# Patient Record
Sex: FEMALE | Race: BLACK OR AFRICAN AMERICAN
[De-identification: names, ages, dates, MRNs, and addresses within clinical notes are randomized per-mention and may not be internally consistent; named-entity substitution may affect disease eponyms.]

---

## 2017-04-07 ENCOUNTER — HOSPITAL ENCOUNTER (OUTPATIENT)
Dept: HOSPITAL 84 - OBSVTIME | Age: 62
LOS: 1 days | Discharge: HOME | End: 2017-04-08
Attending: FAMILY MEDICINE
Payer: MEDICARE

## 2017-04-07 VITALS — WEIGHT: 96 LBS | BODY MASS INDEX: 15.43 KG/M2 | HEIGHT: 66 IN

## 2017-04-07 DIAGNOSIS — E11.65: ICD-10-CM

## 2017-04-07 DIAGNOSIS — Z91.14: ICD-10-CM

## 2017-04-07 DIAGNOSIS — G45.9: Primary | ICD-10-CM

## 2017-04-07 DIAGNOSIS — R47.81: ICD-10-CM

## 2017-04-07 DIAGNOSIS — Z79.84: ICD-10-CM

## 2017-04-07 DIAGNOSIS — Z86.73: ICD-10-CM

## 2017-04-07 LAB
ALBUMIN SERPL-MCNC: 4.4 G/DL (ref 3.4–5)
ALP SERPL-CCNC: 227 U/L (ref 46–116)
ALT SERPL-CCNC: 19 U/L (ref 10–68)
ANION GAP SERPL CALC-SCNC: 31.4 MMOL/L (ref 8–16)
APPEARANCE UR: CLEAR
APTT BLD: 24.9 SECONDS (ref 22.8–39.4)
BACTERIA #/AREA URNS HPF: (no result) /HPF
BASOPHILS NFR BLD AUTO: 0.6 % (ref 0–2)
BILIRUB SERPL-MCNC: 0.46 MG/DL (ref 0.2–1.3)
BILIRUB SERPL-MCNC: NEGATIVE MG/DL
BUN SERPL-MCNC: 30 MG/DL (ref 7–18)
CALCIUM SERPL-MCNC: 10.1 MG/DL (ref 8.5–10.1)
CHLORIDE SERPL-SCNC: 92 MMOL/L (ref 98–107)
CO2 SERPL-SCNC: 14.6 MMOL/L (ref 21–32)
COLOR UR: YELLOW
CREAT SERPL-MCNC: 1.5 MG/DL (ref 0.6–1.3)
EOSINOPHIL NFR BLD: 0 % (ref 0–7)
ERYTHROCYTE [DISTWIDTH] IN BLOOD BY AUTOMATED COUNT: 13.8 % (ref 11.5–14.5)
GLOBULIN SER-MCNC: 5.9 G/L
GLUCOSE SERPL-MCNC: 1000 MG/DL
GLUCOSE SERPL-MCNC: 683 MG/DL (ref 74–106)
HCT VFR BLD CALC: 40.3 % (ref 36–48)
HGB BLD-MCNC: 12.4 G/DL (ref 12–16)
IMM GRANULOCYTES NFR BLD: 0.2 % (ref 0–5)
INR PPP: 0.95 (ref 0.85–1.17)
KETONES SERPL-MCNC: (no result) MG/DL
KETONES UR STRIP-MCNC: (no result) MG/DL
LEUKOCYTE ESTERASE: NEGATIVE
LYMPHOCYTES NFR BLD AUTO: 29.5 % (ref 15–50)
MAGNESIUM SERPL-MCNC: 2.2 MG/DL (ref 1.8–2.4)
MCH RBC QN AUTO: 28.8 PG (ref 26–34)
MCHC RBC AUTO-ENTMCNC: 30.8 G/DL (ref 31–37)
MCV RBC: 93.5 FL (ref 80–100)
MONOCYTES NFR BLD: 5.8 % (ref 2–11)
NEUTROPHILS NFR BLD AUTO: 63.9 % (ref 40–80)
NITRITE UR-MCNC: NEGATIVE MG/ML
OSMOLALITY SERPL CALC.SUM OF ELEC: 303 MOSM/KG (ref 275–300)
PH UR STRIP: 5 [PH] (ref 5–6)
PLATELET # BLD: 203 10X3/UL (ref 130–400)
PMV BLD AUTO: 11.9 FL (ref 7.4–10.4)
POTASSIUM SERPL-SCNC: 5 MMOL/L (ref 3.5–5.1)
PROT SERPL-MCNC: 10.3 G/DL (ref 6.4–8.2)
PROT UR-MCNC: (no result) MG/DL
PROTHROMBIN TIME: 12.5 SECONDS (ref 11.6–15)
RBC # BLD AUTO: 4.31 10X6/UL (ref 4–5.4)
RBC #/AREA URNS HPF: (no result) /HPF (ref 0–5)
SODIUM SERPL-SCNC: 133 MMOL/L (ref 136–145)
SP GR UR STRIP: 1.01 (ref 1–1.02)
SQUAMOUS #/AREA URNS HPF: (no result) /HPF (ref 0–5)
UROBILINOGEN UR-MCNC: NORMAL MG/DL
WBC # BLD AUTO: 8.2 10X3/UL (ref 4.8–10.8)
WBC #/AREA URNS HPF: (no result) /HPF (ref 0–5)

## 2017-04-08 VITALS — DIASTOLIC BLOOD PRESSURE: 60 MMHG | SYSTOLIC BLOOD PRESSURE: 105 MMHG

## 2017-04-08 VITALS — DIASTOLIC BLOOD PRESSURE: 64 MMHG | SYSTOLIC BLOOD PRESSURE: 104 MMHG

## 2017-04-08 VITALS — SYSTOLIC BLOOD PRESSURE: 103 MMHG | DIASTOLIC BLOOD PRESSURE: 61 MMHG

## 2017-04-08 VITALS — SYSTOLIC BLOOD PRESSURE: 141 MMHG | DIASTOLIC BLOOD PRESSURE: 67 MMHG

## 2017-04-08 VITALS — SYSTOLIC BLOOD PRESSURE: 104 MMHG | DIASTOLIC BLOOD PRESSURE: 64 MMHG

## 2017-04-08 VITALS — SYSTOLIC BLOOD PRESSURE: 95 MMHG | DIASTOLIC BLOOD PRESSURE: 57 MMHG

## 2017-04-08 NOTE — NUR
PT REC'D FROM [facility]. PT RESTING IN BED WITH SISTERS AT BEDSIDE. AAOX4.
PIV TO L FOREARM SALINE LOCKED. NO S/SX OF INFECTION. BED LOW, CALL LIGHT IN
REACH, DENIES NEEDS. CPOC.

## 2017-04-08 NOTE — NUR
CURRENT FSBS 107. NO INSULIN ADMINISTERED PER SS. PT RESTING IN BED WITH LUNCH
TRAY IN ROOM. FAMILY AT BEDSIDE. BED LOW, CALL LIGHT IN REACH, DENIES NEEDS.
CPOC.

## 2017-04-08 NOTE — NUR
PT AOX4 RESP EVEN AND NONLABORED PT DENIES NEEDS AT THIS TIME IV TO LEFT
FOREARM PATENT AND INTACT. PT IN FOR DEHYDRATION AND NONCOMPLIANT DM. GIVING
FLUIDS IV AND MONITORING FSBS. BED AT LOWEST SETTING CALL LIGHT WITHIN REACH
WILL CONTINUE TO MONITOR

## 2017-04-08 NOTE — NUR
PIV TO L FOREARM DC'D WITH CATHETER INTACT. DRESSING APPLIED. ESCORTED OUT VIA
WC. DC PAPERS IN HAND.

## 2017-04-08 NOTE — NUR
8 UNITS OF INSULIN ADMINISTERED PER SS. FSBS 248. DINNER TRAY AT BEDSIDE.
FAMILY IN ROOM. ORDERS FOR DC OBTAINED. BED LOW, CALL LIGHT IN REACH, DENIES
NEEDS. CPOC.

## 2017-04-08 NOTE — NUR
PATIENT O2 TURNED DOWN TO 1.5 DUE TO SPO2 READING 100 ON LEFT THUMB. INCREASED
CAP. FILL TIME TO 6SECONDS.

## 2017-10-29 ENCOUNTER — HOSPITAL ENCOUNTER (INPATIENT)
Dept: HOSPITAL 84 - D.ER | Age: 62
LOS: 2 days | Discharge: HOME | DRG: 637 | End: 2017-10-31
Attending: FAMILY MEDICINE | Admitting: FAMILY MEDICINE
Payer: MEDICARE

## 2017-10-29 VITALS — DIASTOLIC BLOOD PRESSURE: 59 MMHG | SYSTOLIC BLOOD PRESSURE: 111 MMHG

## 2017-10-29 VITALS — SYSTOLIC BLOOD PRESSURE: 119 MMHG | DIASTOLIC BLOOD PRESSURE: 63 MMHG

## 2017-10-29 VITALS — DIASTOLIC BLOOD PRESSURE: 64 MMHG | SYSTOLIC BLOOD PRESSURE: 111 MMHG

## 2017-10-29 VITALS — DIASTOLIC BLOOD PRESSURE: 67 MMHG | SYSTOLIC BLOOD PRESSURE: 111 MMHG

## 2017-10-29 VITALS — DIASTOLIC BLOOD PRESSURE: 60 MMHG | SYSTOLIC BLOOD PRESSURE: 115 MMHG

## 2017-10-29 VITALS — DIASTOLIC BLOOD PRESSURE: 67 MMHG | SYSTOLIC BLOOD PRESSURE: 144 MMHG

## 2017-10-29 VITALS — DIASTOLIC BLOOD PRESSURE: 65 MMHG | SYSTOLIC BLOOD PRESSURE: 111 MMHG

## 2017-10-29 VITALS — DIASTOLIC BLOOD PRESSURE: 74 MMHG | SYSTOLIC BLOOD PRESSURE: 125 MMHG

## 2017-10-29 VITALS — SYSTOLIC BLOOD PRESSURE: 110 MMHG | DIASTOLIC BLOOD PRESSURE: 58 MMHG

## 2017-10-29 VITALS — DIASTOLIC BLOOD PRESSURE: 58 MMHG | SYSTOLIC BLOOD PRESSURE: 119 MMHG

## 2017-10-29 VITALS — SYSTOLIC BLOOD PRESSURE: 115 MMHG | DIASTOLIC BLOOD PRESSURE: 60 MMHG

## 2017-10-29 VITALS — SYSTOLIC BLOOD PRESSURE: 115 MMHG | DIASTOLIC BLOOD PRESSURE: 70 MMHG

## 2017-10-29 VITALS — DIASTOLIC BLOOD PRESSURE: 58 MMHG | SYSTOLIC BLOOD PRESSURE: 123 MMHG

## 2017-10-29 VITALS — SYSTOLIC BLOOD PRESSURE: 164 MMHG | DIASTOLIC BLOOD PRESSURE: 79 MMHG

## 2017-10-29 VITALS — SYSTOLIC BLOOD PRESSURE: 119 MMHG | DIASTOLIC BLOOD PRESSURE: 66 MMHG

## 2017-10-29 VITALS — SYSTOLIC BLOOD PRESSURE: 112 MMHG | DIASTOLIC BLOOD PRESSURE: 63 MMHG

## 2017-10-29 VITALS — DIASTOLIC BLOOD PRESSURE: 63 MMHG | SYSTOLIC BLOOD PRESSURE: 112 MMHG

## 2017-10-29 VITALS — BODY MASS INDEX: 14.7 KG/M2

## 2017-10-29 VITALS — SYSTOLIC BLOOD PRESSURE: 111 MMHG | DIASTOLIC BLOOD PRESSURE: 64 MMHG

## 2017-10-29 DIAGNOSIS — Z79.4: ICD-10-CM

## 2017-10-29 DIAGNOSIS — N17.9: ICD-10-CM

## 2017-10-29 DIAGNOSIS — G93.41: ICD-10-CM

## 2017-10-29 DIAGNOSIS — E11.10: Primary | ICD-10-CM

## 2017-10-29 DIAGNOSIS — Z74.09: ICD-10-CM

## 2017-10-29 DIAGNOSIS — I69.919: ICD-10-CM

## 2017-10-29 DIAGNOSIS — I69.954: ICD-10-CM

## 2017-10-29 DIAGNOSIS — E53.8: ICD-10-CM

## 2017-10-29 DIAGNOSIS — N39.0: ICD-10-CM

## 2017-10-29 DIAGNOSIS — D50.9: ICD-10-CM

## 2017-10-29 DIAGNOSIS — I69.998: ICD-10-CM

## 2017-10-29 DIAGNOSIS — E03.9: ICD-10-CM

## 2017-10-29 DIAGNOSIS — F01.50: ICD-10-CM

## 2017-10-29 DIAGNOSIS — E55.9: ICD-10-CM

## 2017-10-29 LAB
ALBUMIN SERPL-MCNC: 3.7 G/DL (ref 3.4–5)
ALP SERPL-CCNC: 166 U/L (ref 46–116)
ALT SERPL-CCNC: 19 U/L (ref 10–68)
AMPHETAMINES UR QL SCN: NEGATIVE QUAL
ANION GAP SERPL CALC-SCNC: 23.5 MMOL/L (ref 8–16)
ANION GAP SERPL CALC-SCNC: 33.7 MMOL/L (ref 8–16)
APPEARANCE UR: (no result)
BACTERIA #/AREA URNS HPF: (no result) /HPF
BARBITURATES UR QL SCN: NEGATIVE QUAL
BASOPHILS NFR BLD AUTO: 0.1 % (ref 0–2)
BENZODIAZ UR QL SCN: NEGATIVE QUAL
BILIRUB SERPL-MCNC: 0.34 MG/DL (ref 0.2–1.3)
BILIRUB SERPL-MCNC: NEGATIVE MG/DL
BUN SERPL-MCNC: 38 MG/DL (ref 7–18)
BUN SERPL-MCNC: 43 MG/DL (ref 7–18)
BZE UR QL SCN: NEGATIVE QUAL
CALCIUM SERPL-MCNC: 8.7 MG/DL (ref 8.5–10.1)
CALCIUM SERPL-MCNC: 8.8 MG/DL (ref 8.5–10.1)
CANNABINOIDS UR QL SCN: NEGATIVE QUAL
CHLORIDE SERPL-SCNC: 113 MMOL/L (ref 98–107)
CHLORIDE SERPL-SCNC: 116 MMOL/L (ref 98–107)
CK SERPL-CCNC: 76 UL (ref 21–215)
CO2 SERPL-SCNC: 17.1 MMOL/L (ref 21–32)
CO2 SERPL-SCNC: 9.1 MMOL/L (ref 21–32)
COLOR UR: YELLOW
CREAT SERPL-MCNC: 1.5 MG/DL (ref 0.6–1.3)
CREAT SERPL-MCNC: 1.7 MG/DL (ref 0.6–1.3)
EOSINOPHIL NFR BLD: 0 % (ref 0–7)
ERYTHROCYTE [DISTWIDTH] IN BLOOD BY AUTOMATED COUNT: 13.4 % (ref 11.5–14.5)
GLOBULIN SER-MCNC: 4.8 G/L
GLUCOSE SERPL-MCNC: 1000 MG/DL
GLUCOSE SERPL-MCNC: 286 MG/DL (ref 74–106)
GLUCOSE SERPL-MCNC: 430 MG/DL (ref 74–106)
GRAN CASTS #/AREA URNS LPF: (no result) /LPF
HCT VFR BLD CALC: 39.7 % (ref 36–48)
HGB BLD-MCNC: 11.8 G/DL (ref 12–16)
IMM GRANULOCYTES NFR BLD: 0.5 % (ref 0–5)
KETONES UR STRIP-MCNC: (no result) MG/DL
LYMPHOCYTES NFR BLD AUTO: 6.9 % (ref 15–50)
MAGNESIUM SERPL-MCNC: 2.1 MG/DL (ref 1.8–2.4)
MAGNESIUM SERPL-MCNC: 2.2 MG/DL (ref 1.8–2.4)
MCH RBC QN AUTO: 28.6 PG (ref 26–34)
MCHC RBC AUTO-ENTMCNC: 29.7 G/DL (ref 31–37)
MCV RBC: 96.4 FL (ref 80–100)
MONOCYTES NFR BLD: 1.1 % (ref 2–11)
NEUTROPHILS NFR BLD AUTO: 91.4 % (ref 40–80)
NITRITE UR-MCNC: NEGATIVE MG/ML
OPIATES UR QL SCN: NEGATIVE QUAL
OSMOLALITY SERPL CALC.SUM OF ELEC: 322 MOSM/KG (ref 275–300)
OSMOLALITY SERPL CALC.SUM OF ELEC: 330 MOSM/KG (ref 275–300)
PCP UR QL SCN: NEGATIVE QUAL
PH UR STRIP: 5 [PH] (ref 5–6)
PLATELET # BLD: 166 10X3/UL (ref 130–400)
PMV BLD AUTO: 11.7 FL (ref 7.4–10.4)
POTASSIUM SERPL-SCNC: 3.6 MMOL/L (ref 3.5–5.1)
POTASSIUM SERPL-SCNC: 3.8 MMOL/L (ref 3.5–5.1)
PROT SERPL-MCNC: 8.5 G/DL (ref 6.4–8.2)
PROT UR-MCNC: NEGATIVE MG/DL
RBC # BLD AUTO: 4.12 10X6/UL (ref 4–5.4)
RBC #/AREA URNS HPF: (no result) /HPF (ref 0–5)
SODIUM SERPL-SCNC: 152 MMOL/L (ref 136–145)
SODIUM SERPL-SCNC: 153 MMOL/L (ref 136–145)
SP GR UR STRIP: 1.01 (ref 1–1.02)
SQUAMOUS #/AREA URNS HPF: (no result) /HPF (ref 0–5)
TROPONIN I SERPL-MCNC: < 0.017 NG/ML (ref 0–0.06)
UROBILINOGEN UR-MCNC: NORMAL MG/DL
WBC # BLD AUTO: 16.2 10X3/UL (ref 4.8–10.8)

## 2017-10-29 NOTE — NUR
REASSESSMENT COMPLETE. NO ACUTE CHANGES FROM PREVIOUS ASSESSMENT. WILL
CONTINUE TO MONITOR. Q1H FSBS. SEE FLOW SHEET FOR DETAILS.

## 2017-10-29 NOTE — NUR
ASSESSMENT COMPLETE. S1S2. NSR SHOWING ON MONITOR. RR EQUAL AND UNLABORED. PT
ABLE TO ANSWER QUESTIONS APPROPRIATELY. SPEECH SLIGHT SLURRED. BOWEL SOUNDS
ACTIVE; DENIES TENDERNESS. RADIAL AND PEDAL PULSES PALPATED. PERRLA.  PIV TO
RFA; PATENT. PT ABLE TO MAKE POSITION CHANGES. AAO. ON ROOM AIR.

## 2017-10-29 NOTE — NUR
ARRIVED FROM ER VIA STRETCHER AND TWO NURSES, TRANSFERED TO BED FROM STRETCHER
WITH MIN. ASSIST. AWAKE AND ALERT, SPEECH IS SLURRED AND VERY DIFFICULT TO
UNDERSTAND.  PUPILS ARE PERRLA AT 2 TO 3MM. PATIENT IS CALM AND COOPERATIVE,
PRIMARY ASSESSMENT PER FLOW SHEET. TEMP 97.5 PULSE 94 RHYTHM SR RESP. RATE 18
99 % O2 SAT ON ROOM AIR HT: 5'6" WT. 91.4 LBS. ABLE TO FOLLOW COMMANDS.

## 2017-10-30 VITALS — SYSTOLIC BLOOD PRESSURE: 122 MMHG | DIASTOLIC BLOOD PRESSURE: 60 MMHG

## 2017-10-30 VITALS — DIASTOLIC BLOOD PRESSURE: 71 MMHG | SYSTOLIC BLOOD PRESSURE: 126 MMHG

## 2017-10-30 VITALS — DIASTOLIC BLOOD PRESSURE: 64 MMHG | SYSTOLIC BLOOD PRESSURE: 119 MMHG

## 2017-10-30 VITALS — SYSTOLIC BLOOD PRESSURE: 120 MMHG | DIASTOLIC BLOOD PRESSURE: 66 MMHG

## 2017-10-30 VITALS — DIASTOLIC BLOOD PRESSURE: 70 MMHG | SYSTOLIC BLOOD PRESSURE: 126 MMHG

## 2017-10-30 VITALS — SYSTOLIC BLOOD PRESSURE: 124 MMHG | DIASTOLIC BLOOD PRESSURE: 65 MMHG

## 2017-10-30 VITALS — SYSTOLIC BLOOD PRESSURE: 114 MMHG | DIASTOLIC BLOOD PRESSURE: 67 MMHG

## 2017-10-30 VITALS — SYSTOLIC BLOOD PRESSURE: 120 MMHG | DIASTOLIC BLOOD PRESSURE: 65 MMHG

## 2017-10-30 VITALS — DIASTOLIC BLOOD PRESSURE: 72 MMHG | SYSTOLIC BLOOD PRESSURE: 124 MMHG

## 2017-10-30 VITALS — DIASTOLIC BLOOD PRESSURE: 68 MMHG | SYSTOLIC BLOOD PRESSURE: 120 MMHG

## 2017-10-30 VITALS — SYSTOLIC BLOOD PRESSURE: 131 MMHG | DIASTOLIC BLOOD PRESSURE: 72 MMHG

## 2017-10-30 VITALS — DIASTOLIC BLOOD PRESSURE: 76 MMHG | SYSTOLIC BLOOD PRESSURE: 129 MMHG

## 2017-10-30 VITALS — SYSTOLIC BLOOD PRESSURE: 138 MMHG | DIASTOLIC BLOOD PRESSURE: 78 MMHG

## 2017-10-30 VITALS — DIASTOLIC BLOOD PRESSURE: 61 MMHG | SYSTOLIC BLOOD PRESSURE: 112 MMHG

## 2017-10-30 VITALS — SYSTOLIC BLOOD PRESSURE: 130 MMHG | DIASTOLIC BLOOD PRESSURE: 69 MMHG

## 2017-10-30 LAB
ANION GAP SERPL CALC-SCNC: 17.5 MMOL/L (ref 8–16)
ANION GAP SERPL CALC-SCNC: 23.9 MMOL/L (ref 8–16)
BASOPHILS NFR BLD AUTO: 0.1 % (ref 0–2)
BUN SERPL-MCNC: 22 MG/DL (ref 7–18)
BUN SERPL-MCNC: 25 MG/DL (ref 7–18)
CALCIUM SERPL-MCNC: 8.7 MG/DL (ref 8.5–10.1)
CALCIUM SERPL-MCNC: 9 MG/DL (ref 8.5–10.1)
CHLORIDE SERPL-SCNC: 114 MMOL/L (ref 98–107)
CHLORIDE SERPL-SCNC: 118 MMOL/L (ref 98–107)
CHOLEST/HDLC SERPL: 1.8 RATIO (ref 2.3–4.1)
CO2 SERPL-SCNC: 16.5 MMOL/L (ref 21–32)
CO2 SERPL-SCNC: 22.7 MMOL/L (ref 21–32)
CREAT SERPL-MCNC: 1 MG/DL (ref 0.6–1.3)
CREAT SERPL-MCNC: 1.2 MG/DL (ref 0.6–1.3)
EOSINOPHIL NFR BLD: 0 % (ref 0–7)
ERYTHROCYTE [DISTWIDTH] IN BLOOD BY AUTOMATED COUNT: 12.7 % (ref 11.5–14.5)
EST. AVERAGE GLUCOSE BLD GHB EST-MCNC: 329 MG/DL (ref 74–154)
GLUCOSE SERPL-MCNC: 200 MG/DL (ref 74–106)
GLUCOSE SERPL-MCNC: 261 MG/DL (ref 74–106)
HBA1C MFR BLD: 13.1 % (ref 4.8–6)
HCT VFR BLD CALC: 32.9 % (ref 36–48)
HDLC SERPL-MCNC: 102 MG/DL (ref 32–96)
HGB BLD-MCNC: 10.8 G/DL (ref 12–16)
IMM GRANULOCYTES NFR BLD: 0.2 % (ref 0–5)
KETONES SERPL-MCNC: (no result) MG/DL
LDL-HDL RATIO: 0.7 RATIO (ref 1.5–3.5)
LDLC SERPL-MCNC: 68 MG/DL (ref 0–100)
LYMPHOCYTES NFR BLD AUTO: 10 % (ref 15–50)
MAGNESIUM SERPL-MCNC: 1.9 MG/DL (ref 1.8–2.4)
MAGNESIUM SERPL-MCNC: 2 MG/DL (ref 1.8–2.4)
MCH RBC QN AUTO: 28.5 PG (ref 26–34)
MCHC RBC AUTO-ENTMCNC: 32.8 G/DL (ref 31–37)
MCV RBC: 86.8 FL (ref 80–100)
MONOCYTES NFR BLD: 7.8 % (ref 2–11)
NEUTROPHILS NFR BLD AUTO: 81.9 % (ref 40–80)
OSMOLALITY SERPL CALC.SUM OF ELEC: 309 MOSM/KG (ref 275–300)
OSMOLALITY SERPL CALC.SUM OF ELEC: 314 MOSM/KG (ref 275–300)
PHOSPHATE SERPL-MCNC: 2 MG/DL (ref 2.5–4.9)
PLATELET # BLD: 177 10X3/UL (ref 130–400)
PMV BLD AUTO: 9.9 FL (ref 7.4–10.4)
POTASSIUM SERPL-SCNC: 4.2 MMOL/L (ref 3.5–5.1)
POTASSIUM SERPL-SCNC: 4.4 MMOL/L (ref 3.5–5.1)
RBC # BLD AUTO: 3.79 10X6/UL (ref 4–5.4)
SODIUM SERPL-SCNC: 150 MMOL/L (ref 136–145)
SODIUM SERPL-SCNC: 154 MMOL/L (ref 136–145)
TRIGL SERPL-MCNC: 47 MG/DL (ref 30–200)
WBC # BLD AUTO: 13.1 10X3/UL (ref 4.8–10.8)

## 2017-10-30 NOTE — NUR
PATIENT ARRIVED FROM ICU VIA WHEELCHAIR, ALERT AND ORIENTED TO ROOM AND CALL
LIGHT. DENIES NEEDS AT THIS TIME. CALL LIGHT IN REACH.

## 2017-10-30 NOTE — NUR
ASSESSMENT COMPLETE. S1S2. NSR SHOWING ON MONITOR. RR EQUAL; UNLABORED. BOWEL
SOUNDS ACTIVE X4; CONTINENT OF URINE.  WEAKNESS NOTED TO EXTREMITIES. USES
CALL LIGHT; AAO. PERRLA; RIGHT EYE DEVIATION. RADIAL AND PEDAL PULSES
PALPATED. RIGHT FOREARM PIV; PATENT; NO SIGNS OF INFILTRATION. PT SITTING UP
IN BED DOING CROSSWORD PUZZLE. DENIES PAIN. CLEAR COMMUNICATION NOTED.

## 2017-10-30 NOTE — NUR
PT ALERT AND ORIENTED,  AT PRESENT. INSULIN GTT AT 0.5U/HR. CRITICAL
LABS . PAGED DR MENJIVAR. AWAITING CALL BACK.

## 2017-10-30 NOTE — NUR
REASSESSMENT COMPLETE. NO ACUTE CHANGES FROM PREVIOUS ASSESSMENT. HYPOGLYCEMIA
NOTED; MONITORING CLOSELY. WILL CONTINUE TO MONITOR.

## 2017-10-31 VITALS — DIASTOLIC BLOOD PRESSURE: 80 MMHG | SYSTOLIC BLOOD PRESSURE: 144 MMHG

## 2017-10-31 VITALS — DIASTOLIC BLOOD PRESSURE: 81 MMHG | SYSTOLIC BLOOD PRESSURE: 135 MMHG

## 2017-10-31 LAB
ALP SERPL-CCNC: 99 U/L (ref 46–116)
ANION GAP SERPL CALC-SCNC: 14.4 MMOL/L (ref 8–16)
BASOPHILS NFR BLD AUTO: 0.1 % (ref 0–2)
BILIRUB SERPL-MCNC: 0.29 MG/DL (ref 0.2–1.3)
BUN SERPL-MCNC: 8 MG/DL (ref 7–18)
CALCIUM SERPL-MCNC: 8.6 MG/DL (ref 8.5–10.1)
CHLORIDE SERPL-SCNC: 107 MMOL/L (ref 98–107)
CO2 SERPL-SCNC: 23.9 MMOL/L (ref 21–32)
CREAT SERPL-MCNC: 0.6 MG/DL (ref 0.6–1.3)
EOSINOPHIL NFR BLD: 0.8 % (ref 0–7)
ERYTHROCYTE [DISTWIDTH] IN BLOOD BY AUTOMATED COUNT: 12.9 % (ref 11.5–14.5)
GLUCOSE SERPL-MCNC: 72 MG/DL (ref 74–106)
HCT VFR BLD CALC: 34.8 % (ref 36–48)
HGB BLD-MCNC: 11.7 G/DL (ref 12–16)
IMM GRANULOCYTES NFR BLD: 0.1 % (ref 0–5)
KETONES SERPL-MCNC: NEGATIVE MG/DL
LYMPHOCYTES NFR BLD AUTO: 20.6 % (ref 15–50)
MAGNESIUM SERPL-MCNC: 1.7 MG/DL (ref 1.8–2.4)
MCH RBC QN AUTO: 28.7 PG (ref 26–34)
MCHC RBC AUTO-ENTMCNC: 33.6 G/DL (ref 31–37)
MCV RBC: 85.3 FL (ref 80–100)
MONOCYTES NFR BLD: 5.8 % (ref 2–11)
NEUTROPHILS NFR BLD AUTO: 72.6 % (ref 40–80)
OSMOLALITY SERPL CALC.SUM OF ELEC: 279 MOSM/KG (ref 275–300)
PHOSPHATE SERPL-MCNC: 2.4 MG/DL (ref 2.5–4.9)
PMV BLD AUTO: 11.4 FL (ref 7.4–10.4)
POTASSIUM SERPL-SCNC: 3.3 MMOL/L (ref 3.5–5.1)
RBC # BLD AUTO: 4.08 10X6/UL (ref 4–5.4)
SODIUM SERPL-SCNC: 142 MMOL/L (ref 136–145)
WBC # BLD AUTO: 8.8 10X3/UL (ref 4.8–10.8)

## 2017-10-31 NOTE — NUR
PT DID NOT WANT ANY MORE OF IV MEDICATION DUE TO AREA OF AREA OF ARM
"BURNING". IV REMOVED TO RIGHT FOREARM. HEART MONITOR REMOVED AND RETURNED
TO [facility] IN TELEMETRY. D/C INSTRUCTIONS EXPLAINED TO PT AND PTS FAMILY
(DAUGHTER). D/C PAPERWORK SIGNED BY PT AND PLACED IN CHART. FAMILY IS GETTING
PT READY TO D/C NOW.

## 2017-10-31 NOTE — NUR
Patient Name: FLEX GIRON Admission Status: ER
Accout number: [id number] Admission Date: 10-
: 1955 Admission Diagnosis:DISORIENTATION, UNSPECIFIED
Attending: DENISA MENJIVAR Current LOS: 2
 
Anticipated DC Date: 10-
Planned Disposition: Home
Primary Insurance: MEDICARE A & B
 
 
Discharge Planning Comments:
* Is the patient Alert and Oriented? Yes 0
* How many steps to enter\exit or inside your home? NONE 0
* PCP DR. PIZARRO 0
* Pharmacy [facility] [place] [facility]
* Preadmission Environment Home with Family 0
* ADLs Partial Dependent 0
* Partial ADLs (Assistance needed) Bathing
Medication Management 0
* Equipment Cane
Rolling Walker 0
* Other Equipment NO MEDICAL EQUIPMENT PROVIDER PREFERENCE 0
* List name and contact numbers for known caregivers / representatives who
currently or will assist patient after discharge: LARRY STRINGER,
[phone] 0
* Community resources currently utilized Private Duty Care 0
* Please name any agencies selected above. [facility] PAYS LARRY FOR
18 HOURS PER WEEK PERSONAL CARE FOR PT. 0
* Additional services required to return to the preadmission environment? No 0
* Can the patient safely return to the preadmission environment? Yes 0
* Has this patient been hospitalized within the prior 30 days at any hospital?
No 0
 
CM MET WITH PT IN ROOM TO DISCUSS DISCHARGE PLANNING AND NEEDS. PT REPORTS
LIVING AT HOME INDEPENDENTLY WITH HER MOTHER. PT HAS CANE AND ROLLING WALKER
WITH NO MEDICAL EQUIPMENT PROVIDER PREFERENCE. PT REPORTS HAVING HOME CARE,
BUT CANNOT REMEMBER THE NAME OF HER HOME CARE AGENCY. CM DISCUSSED
AVAILABILITY OF HOME HEALTH, REHAB SERVICES AND MEDICAL EQUIPMENT. PT DENIES
DISCHARGE NEEDS, REPORTS HER NIECE WILL PICK HER UP FOR DISCHARGE HOME. CM
CALLED PT'S JERI ANDRADE, [phone], WHO DENIES DISCHARGE NEEDS, WOULD
LIKE A REFERRAL FOR OUTPATIENT PHYSICAL THERAPY FOR PT; JERI IS PT'S
CAREGIVER, PAID FOR THROUGH [facility]. THEY DO NOT WANT HOME
HEALTH. CM PAGED AND SPOKE TO DR. MENJIVAR WHO DIRECTED CM TO HAVE THE PT CONTACT
HER PCP FOR ANY THERAPY ORDERS. CM NOTIFIED PT IN ROOM AND JERI VIA PHONE.
PT AND JERI DENIED FURTHER NEEDS.
 
: Cayden Menchaca

## 2017-10-31 NOTE — NUR
PTS LAST BAG OF MAGNESIUM INFUSED. POTASSIUM PHOSPHATE HUNG AS ORDERED. WILL
AWAIT BAG OF POTASSIUM PHOSPHATE TO RUN AND THEN WILL D/C PT AS ORDERED. SHAD
WITH CASE MANAGEMENT STATES THAT PTS NEICE IS SUPPOSE TO COME GET HER. WILL
CONTINUE TO MONITOR.

## 2017-10-31 NOTE — NUR
AM ROUNDING- RECEIVED REPORT FROM NIGHT SHIFT NURSE LANA. PT IS CURRENTLY
LAYING IN BED WITH EYES CLOSED RESTING. ON ROOM AIR. NO MONITOR. IV SEEN TO
RIGHT FOREARM WITH 1/2 NS RUNNING AT 125CC. NO NEED AT THIS CURRENT TIME. WILL
CONTINUE TO MONITOR AND CONTINUE WITH PLAN OF CARE.

## 2017-10-31 NOTE — NUR
SPOKE WITH ESSENCE IN PHARMACY. ASKED ESSENCE IF IT WAS COMPATIBLE TO RUN POTASSIUM
PHOSPHATE IV AND MAGNESIUM IV AS ORDERED (ORDERED FOR SAME TIME), ESSENCE STATES
IT IS OKAY TO RUN TOGETHER AT THE Y SITE.
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
## 2019-02-17 ENCOUNTER — HOSPITAL ENCOUNTER (EMERGENCY)
Dept: HOSPITAL 84 - D.ER | Age: 64
Discharge: HOME | End: 2019-02-17
Payer: MEDICARE

## 2019-02-17 VITALS
HEIGHT: 66 IN | WEIGHT: 117 LBS | HEIGHT: 66 IN | BODY MASS INDEX: 18.8 KG/M2 | BODY MASS INDEX: 18.8 KG/M2 | WEIGHT: 117 LBS

## 2019-02-17 VITALS — DIASTOLIC BLOOD PRESSURE: 74 MMHG | SYSTOLIC BLOOD PRESSURE: 158 MMHG

## 2019-02-17 DIAGNOSIS — Z86.73: ICD-10-CM
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
## 2019-02-17 DIAGNOSIS — R41.0: Primary | ICD-10-CM

## 2019-02-17 DIAGNOSIS — E11.649: ICD-10-CM

## 2019-02-17 DIAGNOSIS — T16.2XXA: ICD-10-CM
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
## 2019-02-17 LAB
ALBUMIN SERPL-MCNC: 3.7 G/DL (ref 3.4–5)
ALP SERPL-CCNC: 118 U/L (ref 46–116)
ALT SERPL-CCNC: 15 U/L (ref 10–68)
AMPHETAMINES UR QL SCN: NEGATIVE QUAL
ANION GAP SERPL CALC-SCNC: 12.7 MMOL/L (ref 8–16)
APPEARANCE UR: CLEAR
APTT BLD: 24 SECONDS (ref 22.8–39.4)
BARBITURATES UR QL SCN: NEGATIVE QUAL
BASOPHILS NFR BLD AUTO: 0.4 % (ref 0–2)
BENZODIAZ UR QL SCN: NEGATIVE QUAL
BILIRUB SERPL-MCNC: 0.2 MG/DL (ref 0.2–1.3)
BILIRUB SERPL-MCNC: NEGATIVE MG/DL
BUN SERPL-MCNC: 17 MG/DL (ref 7–18)
BZE UR QL SCN: NEGATIVE QUAL
CALCIUM SERPL-MCNC: 9.2 MG/DL (ref 8.5–10.1)
CANNABINOIDS UR QL SCN: NEGATIVE QUAL
CHLORIDE SERPL-SCNC: 104 MMOL/L (ref 98–107)
CK MB SERPL-MCNC: 1.1 U/L (ref 0–3.6)
CK SERPL-CCNC: 261 UL (ref 21–215)
CO2 SERPL-SCNC: 28.7 MMOL/L (ref 21–32)
COLOR UR: YELLOW
CREAT SERPL-MCNC: 0.9 MG/DL (ref 0.6–1.3)
EOSINOPHIL NFR BLD: 0.6 % (ref 0–7)
ERYTHROCYTE [DISTWIDTH] IN BLOOD BY AUTOMATED COUNT: 13.4 % (ref 11.5–14.5)
GLOBULIN SER-MCNC: 5.3 G/L
GLUCOSE SERPL-MCNC: 1000 MG/DL
GLUCOSE SERPL-MCNC: 119 MG/DL (ref 74–106)
HCT VFR BLD CALC: 35.4 % (ref 36–48)
HGB BLD-MCNC: 11.6 G/DL (ref 12–16)
IMM GRANULOCYTES NFR BLD: 0 % (ref 0–5)
INR PPP: 1 (ref 0.85–1.17)
KETONES UR STRIP-MCNC: NEGATIVE MG/DL
LYMPHOCYTES NFR BLD AUTO: 36.9 % (ref 15–50)
MAGNESIUM SERPL-MCNC: 1.7 MG/DL (ref 1.8–2.4)
MCH RBC QN AUTO: 28.5 PG (ref 26–34)
MCHC RBC AUTO-ENTMCNC: 32.8 G/DL (ref 31–37)
MCV RBC: 87 FL (ref 80–100)
MONOCYTES NFR BLD: 9.9 % (ref 2–11)
NEUTROPHILS NFR BLD AUTO: 52.2 % (ref 40–80)
NITRITE UR-MCNC: NEGATIVE MG/ML
OPIATES UR QL SCN: NEGATIVE QUAL
OSMOLALITY SERPL CALC.SUM OF ELEC: 283 MOSM/KG (ref 275–300)
PCP UR QL SCN: NEGATIVE QUAL
PH UR STRIP: 5 [PH] (ref 5–6)
PLATELET # BLD: 152 10X3/UL (ref 130–400)
PMV BLD AUTO: 10.7 FL (ref 7.4–10.4)
POTASSIUM SERPL-SCNC: 4.4 MMOL/L (ref 3.5–5.1)
PROT SERPL-MCNC: 9 G/DL (ref 6.4–8.2)
PROT UR-MCNC: NEGATIVE MG/DL
PROTHROMBIN TIME: 12.7 SECONDS (ref 11.6–15)
RBC # BLD AUTO: 4.07 10X6/UL (ref 4–5.4)
RBC #/AREA URNS HPF: (no result) /HPF (ref 0–5)
SODIUM SERPL-SCNC: 141 MMOL/L (ref 136–145)
SP GR UR STRIP: 1.02 (ref 1–1.02)
TROPONIN I SERPL-MCNC: < 0.017 NG/ML (ref 0–0.06)
TSH SERPL-ACNC: 3.21 UIU/ML (ref 0.36–3.74)
UROBILINOGEN UR-MCNC: NORMAL MG/DL
VALPROATE SERPL-MCNC: 90.7 UG/ML (ref 50–100)
WBC # BLD AUTO: 4.9 10X3/UL (ref 4.8–10.8)
WBC #/AREA URNS HPF: (no result) /HPF (ref 0–5)

## 2020-03-05 ENCOUNTER — HOSPITAL ENCOUNTER (EMERGENCY)
Dept: HOSPITAL 84 - D.ER | Age: 65
Discharge: HOME | End: 2020-03-05
Payer: MEDICARE

## 2020-03-05 VITALS — BODY MASS INDEX: 27.7 KG/M2 | WEIGHT: 172.36 LBS | HEIGHT: 66 IN

## 2020-03-05 VITALS — SYSTOLIC BLOOD PRESSURE: 162 MMHG | DIASTOLIC BLOOD PRESSURE: 76 MMHG
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
## 2020-03-05 DIAGNOSIS — S42.291A: Primary | ICD-10-CM

## 2020-03-05 DIAGNOSIS — E07.9: ICD-10-CM

## 2020-03-05 DIAGNOSIS — E11.9: ICD-10-CM

## 2020-03-05 DIAGNOSIS — F03.90: ICD-10-CM
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
## 2020-03-05 DIAGNOSIS — Z79.4: ICD-10-CM
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
## 2020-03-05 DIAGNOSIS — Z86.73: ICD-10-CM

## 2020-03-05 LAB
ALBUMIN SERPL-MCNC: 3.4 G/DL (ref 3.4–5)
ALP SERPL-CCNC: 85 U/L (ref 30–120)
ALT SERPL-CCNC: 21 U/L (ref 10–68)
ANION GAP SERPL CALC-SCNC: 12 MMOL/L (ref 8–16)
BASOPHILS NFR BLD AUTO: 0.3 % (ref 0–2)
BILIRUB SERPL-MCNC: 0.29 MG/DL (ref 0.2–1.3)
BUN SERPL-MCNC: 14 MG/DL (ref 7–18)
CALCIUM SERPL-MCNC: 8.8 MG/DL (ref 8.5–10.1)
CHLORIDE SERPL-SCNC: 100 MMOL/L (ref 98–107)
CK MB SERPL-MCNC: 0.6 U/L (ref 0–3.6)
CK SERPL-CCNC: 134 UL (ref 21–215)
CO2 SERPL-SCNC: 28 MMOL/L (ref 21–32)
CREAT SERPL-MCNC: 0.8 MG/DL (ref 0.6–1.3)
CRP SERPL-MCNC: 0.3 MG/DL (ref 0–0.9)
EOSINOPHIL NFR BLD: 0.1 % (ref 0–7)
ERYTHROCYTE [DISTWIDTH] IN BLOOD BY AUTOMATED COUNT: 13.2 % (ref 11.5–14.5)
GLOBULIN SER-MCNC: 5.1 G/L
GLUCOSE SERPL-MCNC: 314 MG/DL (ref 74–106)
HCT VFR BLD CALC: 31.3 % (ref 36–48)
HGB BLD-MCNC: 10.1 G/DL (ref 12–16)
IMM GRANULOCYTES NFR BLD: 0.1 % (ref 0–5)
INR PPP: 0.99 (ref 0.85–1.17)
LYMPHOCYTES NFR BLD AUTO: 13 % (ref 15–50)
MCH RBC QN AUTO: 28.8 PG (ref 26–34)
MCHC RBC AUTO-ENTMCNC: 32.3 G/DL (ref 31–37)
MCV RBC: 89.2 FL (ref 80–100)
MONOCYTES NFR BLD: 5.1 % (ref 2–11)
NEUTROPHILS NFR BLD AUTO: 81.4 % (ref 40–80)
NT-PROBNP SERPL-MCNC: 145 PG/ML (ref 0–125)
OSMOLALITY SERPL CALC.SUM OF ELEC: 284 MOSM/KG (ref 275–300)
PLATELET # BLD: 155 10X3/UL (ref 130–400)
PMV BLD AUTO: 10.5 FL (ref 7.4–10.4)
POTASSIUM SERPL-SCNC: 4 MMOL/L (ref 3.5–5.1)
PROT SERPL-MCNC: 8.5 G/DL (ref 6.4–8.2)
PROTHROMBIN TIME: 13 SECONDS (ref 11.6–15)
RBC # BLD AUTO: 3.51 10X6/UL (ref 4–5.4)
SODIUM SERPL-SCNC: 136 MMOL/L (ref 136–145)
TROPONIN I SERPL-MCNC: < 0.017 NG/ML (ref 0–0.06)
TSH SERPL-ACNC: 0.71 UIU/ML (ref 0.36–3.74)
WBC # BLD AUTO: 7.9 10X3/UL (ref 4.8–10.8)